# Patient Record
Sex: MALE | Race: WHITE
[De-identification: names, ages, dates, MRNs, and addresses within clinical notes are randomized per-mention and may not be internally consistent; named-entity substitution may affect disease eponyms.]

---

## 2017-10-05 ENCOUNTER — HOSPITAL ENCOUNTER (OUTPATIENT)
Dept: HOSPITAL 47 - SLEEP | Age: 27
End: 2017-10-05
Attending: INTERNAL MEDICINE
Payer: OTHER GOVERNMENT

## 2017-10-05 DIAGNOSIS — G47.33: ICD-10-CM

## 2017-10-05 DIAGNOSIS — K21.9: ICD-10-CM

## 2017-10-05 DIAGNOSIS — Z87.891: ICD-10-CM

## 2017-10-05 DIAGNOSIS — G47.10: Primary | ICD-10-CM

## 2017-10-05 DIAGNOSIS — E66.9: ICD-10-CM

## 2017-10-05 PROCEDURE — 99211 OFF/OP EST MAY X REQ PHY/QHP: CPT

## 2017-10-05 NOTE — CONS
CONSULTATION



DATE OF SERVICE:

10/05/2017



This patient is a 27-year-old gentleman who has been evaluated in the sleep center for

significant excessive daytime sleepiness and possible obstructive sleep apnea/hypopnea

syndrome.



HISTORY OF PRESENT ILLNESS/SLEEP-WAKE EVALUATION:

Patient's usual sleep schedule on weekdays is from 11 p.m. to 6:30 a.m. On weekends he

usually sleeps from 1 a.m. until 10 a.m. Sometimes he has problems with falling asleep,

although no TV in bedroom.  He snores quite loudly and wakes up from sleep about 3

times with dry mouth, heartburn, restless legs, sweating.  No nocturia.



In the morning patient wakes up tired and it is usually difficult for him to get up and

wake up in the morning. He asked his family to help him wake him up in the morning;

otherwise he could sleep up to 16 hours straight if he has time and nobody wakes him

up.



He has difficulties paying attention, falling asleep during the day. He worries about

his sleep, has problems with memory, concentration and irritability.  [place]

Sleepiness Scale is significantly increased at 18, including sleepiness while driving

the car.



PAST MEDICAL HISTORY:

1. Acid reflux.

2. Headaches.



PAST SURGICAL HISTORY:

None.



MEDICATIONS:

None.



SOCIAL HISTORY:

Smoking less than 1 pack per week.  Alcohol consumption rarely.



FAMILY HISTORY:

Sleep apnea, snoring, headaches, acid reflux.



REVIEW OF SYSTEMS:

Awakenings from sleep. Sleepiness during the day. Not waking up from sleep. .



PHYSICAL EXAMINATION:

 gentleman without distress.

VITAL SIGNS: /71, HR 86, RR 16, height 5 feet 6 inches, weight 192, BMI 30.9.

Neck is 15-1/2 inches in circumference. Temperature 98.2. Oxygen saturation at room air

97%.

HEENT: PERRLA, EOMI. Evaluation of oropharynx showed tongue protrudes midline; small

oropharyngeal air space; low position of soft palate.

NECK: Supple. No JVD. Thyroid is not palpable.

LUNGS: Clear to percussion and to auscultation. Good air exchange. No wheezing or

rhonchi.

HEART: S1, S2 irregularly irregular.

ABDOMEN: Slightly obese.

EXTREMITIES: No clubbing or cyanosis.

CNS: Awake, alert and oriented x3. Cranial nerves 2 to 7 intact. There is no

fasciculation or atrophy noted. No focal deficits observed.



IMPRESSION:

1. Snoring, small oropharyngeal air space, multiple awakenings from sleep, daytime

    sleepiness; obstructive sleep apnea/hypopnea syndrome.

2. Significant excessive daytime sleepiness.  [place] Sleepiness Scale increased to

    18.  The patient could sleep for 16 hours straight. Differential diagnosis would

    include idiopathic hypersomnia.

3. Mild obesity; BMI 30.9.

4. History of headaches.

5. Acid reflux.



PLAN:

1. Polysomnography for evaluation of patient's breathing during sleep.

2. CPAP/BiPAP titration if sleep study confirms obstructive sleep apnea-hypopnea

    syndrome.

3. Preferable position during sleep on the side.

4. No driving if patient feels any sleepiness.  Patient is aware of  civil and

    criminal liability for unsafe driving.

5. I will see patient for follow-up visit to explain results of testing and following

    plan.

6. Multiple sleep latency test if polysomnogram is negative for obstructive sleep

    apnea/hypopnea syndrome.

Thank you very much for referring this patient for consultation.



Sincerely,







Shaq Nuñez MD, PhD, FAASM

Diplomat of American Board of Medical Specialties

American Board of Internal Medicine

Medical Director of [facility]



MMODL / IJN: [id number] / Job#: [id number]

## 2018-07-19 ENCOUNTER — HOSPITAL ENCOUNTER (OUTPATIENT)
Dept: HOSPITAL 47 - RADNMMAIN | Age: 28
Discharge: HOME | End: 2018-07-19
Payer: COMMERCIAL

## 2018-07-19 DIAGNOSIS — R10.11: Primary | ICD-10-CM

## 2018-07-19 DIAGNOSIS — R19.7: ICD-10-CM

## 2018-07-19 PROCEDURE — 78226 HEPATOBILIARY SYSTEM IMAGING: CPT

## 2018-07-19 NOTE — NM
EXAMINATION TYPE: NM hepatobiliary w EF

 

DATE OF EXAM: 7/19/2018

 

COMPARISON: NONE

 

HISTORY: Right upper quadrant pain and diarrhea

 

TECHNIQUE: After the intravenous administration of 5.5 mCi Tc 99m Mebrofenin hepatobiliary scintigrap
hy is performed.  Immediate images post injection.

 

FINDINGS: 

There is satisfactory initial accumulation of tracer by the liver.  The gallbladder is visualized wit
hin 4 minutes.  The small bowel activity is noted within 40 minutes.  At one hour 8 ounces of oral en
sure plus is given to mimic CCK and gallbladder ejection fraction is calculated at 38 %, in the giovanni
l range although lower limits of normal.  Therefore there is no scintigraphic evidence of cystic or c
ommon bile duct obstruction to suggest acute cholecystitis or gallbladder dyskinesia. 

 

IMPRESSION: No scintigraphic evidence of acute or chronic cholecystitis. Gallbladder ejection fractio
n is lower limits of normal but does not fit criteria for biliary dyskinesia.

## 2018-08-09 ENCOUNTER — HOSPITAL ENCOUNTER (OUTPATIENT)
Dept: HOSPITAL 47 - ORWHC2ENDO | Age: 28
Discharge: HOME | End: 2018-08-09
Attending: SURGERY
Payer: COMMERCIAL

## 2018-08-09 VITALS — SYSTOLIC BLOOD PRESSURE: 112 MMHG | HEART RATE: 67 BPM | DIASTOLIC BLOOD PRESSURE: 79 MMHG

## 2018-08-09 VITALS — BODY MASS INDEX: 32.3 KG/M2

## 2018-08-09 VITALS — RESPIRATION RATE: 20 BRPM | TEMPERATURE: 98.5 F

## 2018-08-09 DIAGNOSIS — R19.7: Primary | ICD-10-CM

## 2018-08-09 DIAGNOSIS — Z79.899: ICD-10-CM

## 2018-08-09 DIAGNOSIS — R10.13: ICD-10-CM

## 2018-08-09 DIAGNOSIS — Z87.891: ICD-10-CM

## 2018-08-09 DIAGNOSIS — K21.9: ICD-10-CM

## 2018-08-09 PROCEDURE — 45380 COLONOSCOPY AND BIOPSY: CPT

## 2018-08-09 PROCEDURE — 88305 TISSUE EXAM BY PATHOLOGIST: CPT

## 2018-08-09 NOTE — P.OP
Date of Procedure: 08/09/18


Preoperative Diagnosis: 


Diarrhea


Postoperative Diagnosis: 


Normal colon





Random rectal biopsy


Procedure(s) Performed: 


Colonoscopy


Anesthesia: MAC


Surgeon: Julio Falk


Pathology: other (Rectum)


Condition: stable


Disposition: PACU


Description of Procedure: 


The patient's placed on the endoscopy table in the lateral position.  He 

received IV sedation.  Digital rectal exam was performed which revealed no 

abnormalities.  The prostate was symmetrical without nodules.  The possible 

colonoscope was then placed patient anus passed throughout the entire colon.  

The ileocecal valve was visualized.  The cecum, ascending, transverse and 

descending colon appeared normal.  The scope was brought back into the sigmoid 

colon and this appeared normal.  Scope summer back into the rectum and this 

appeared normal.  A random rectal biopsies performed due to the patient's 

symptoms of diarrhea.  Scope was then brought through the anus and there is no 

internal and external hemorrhoids noted.

## 2018-08-09 NOTE — P.GSHP
History of Present Illness


H&P Date: 08/09/18


Chief Complaint: Diarrhea





Is a 20-year-old male who's had complaints of some intermittent diarrhea.  He's 

also had some complaints of epigastric pain when eating.  He presents today for 

colonoscopy..





Past Medical History


Past Medical History: GERD/Reflux


History of Any Multi-Drug Resistant Organisms: None Reported


Past Surgical History: No Surgical Hx Reported


Past Anesthesia/Blood Transfusion Reactions: No Reported Reaction


Smoking Status: Former smoker





- Past Family History


  ** Mother


Family Medical History: No Reported History





Medications and Allergies


 Home Medications











 Medication  Instructions  Recorded  Confirmed  Type


 


Montelukast [Singulair] 10 mg PO HS 08/08/18 08/09/18 History


 


Omeprazole 40 mg PO DAILY 08/08/18 08/09/18 History











 Allergies











Allergy/AdvReac Type Severity Reaction Status Date / Time


 


No Known Allergies Allergy   Verified 08/08/18 11:54














Surgical - Exam


 Vital Signs











Temp Pulse Resp BP Pulse Ox


 


 98.5 F   98   20   147/81   95 


 


 08/09/18 07:07  08/09/18 07:07  08/09/18 07:07  08/09/18 07:07  08/09/18 07:07














- General


well developed, no distress





- Eyes


PERRL





- ENT


normal pinna





- Neck


no masses





- Respiratory


normal expansion





- Cardiovascular


Rhythm: regular





- Abdomen


Abdomen: soft, non tender





Assessment and Plan


Assessment: 





Diarrhea.  We'll perform colonoscopy.

## 2018-10-18 ENCOUNTER — HOSPITAL ENCOUNTER (EMERGENCY)
Dept: HOSPITAL 47 - EC | Age: 28
Discharge: HOME | End: 2018-10-18
Payer: OTHER GOVERNMENT

## 2018-10-18 VITALS — DIASTOLIC BLOOD PRESSURE: 94 MMHG | SYSTOLIC BLOOD PRESSURE: 115 MMHG | HEART RATE: 70 BPM

## 2018-10-18 VITALS — TEMPERATURE: 98.3 F

## 2018-10-18 VITALS — RESPIRATION RATE: 18 BRPM

## 2018-10-18 DIAGNOSIS — L02.236: Primary | ICD-10-CM

## 2018-10-18 DIAGNOSIS — F17.200: ICD-10-CM

## 2018-10-18 LAB
ALBUMIN SERPL-MCNC: 4.6 G/DL (ref 3.5–5)
ALP SERPL-CCNC: 45 U/L (ref 38–126)
ALT SERPL-CCNC: 50 U/L (ref 21–72)
AMYLASE SERPL-CCNC: 67 U/L (ref 30–110)
ANION GAP SERPL CALC-SCNC: 12 MMOL/L
AST SERPL-CCNC: 39 U/L (ref 17–59)
BASOPHILS # BLD AUTO: 0 K/UL (ref 0–0.2)
BASOPHILS NFR BLD AUTO: 0 %
BUN SERPL-SCNC: 18 MG/DL (ref 9–20)
CALCIUM SPEC-MCNC: 9.7 MG/DL (ref 8.4–10.2)
CHLORIDE SERPL-SCNC: 103 MMOL/L (ref 98–107)
CO2 SERPL-SCNC: 26 MMOL/L (ref 22–30)
EOSINOPHIL # BLD AUTO: 0.5 K/UL (ref 0–0.7)
EOSINOPHIL NFR BLD AUTO: 6 %
ERYTHROCYTE [DISTWIDTH] IN BLOOD BY AUTOMATED COUNT: 5.22 M/UL (ref 4.3–5.9)
ERYTHROCYTE [DISTWIDTH] IN BLOOD: 12.9 % (ref 11.5–15.5)
GLUCOSE SERPL-MCNC: 124 MG/DL (ref 74–99)
HCT VFR BLD AUTO: 44.5 % (ref 39–53)
HGB BLD-MCNC: 15.5 GM/DL (ref 13–17.5)
LIPASE SERPL-CCNC: 155 U/L (ref 23–300)
LYMPHOCYTES # SPEC AUTO: 3.4 K/UL (ref 1–4.8)
LYMPHOCYTES NFR SPEC AUTO: 35 %
MCH RBC QN AUTO: 29.8 PG (ref 25–35)
MCHC RBC AUTO-ENTMCNC: 35 G/DL (ref 31–37)
MCV RBC AUTO: 85.2 FL (ref 80–100)
MONOCYTES # BLD AUTO: 0.5 K/UL (ref 0–1)
MONOCYTES NFR BLD AUTO: 5 %
NEUTROPHILS # BLD AUTO: 5.2 K/UL (ref 1.3–7.7)
NEUTROPHILS NFR BLD AUTO: 53 %
PH UR: 6 [PH] (ref 5–8)
PLATELET # BLD AUTO: 273 K/UL (ref 150–450)
POTASSIUM SERPL-SCNC: 4 MMOL/L (ref 3.5–5.1)
PROT SERPL-MCNC: 7.2 G/DL (ref 6.3–8.2)
SODIUM SERPL-SCNC: 141 MMOL/L (ref 137–145)
SP GR UR: 1 (ref 1–1.03)
UROBILINOGEN UR QL STRIP: <2 MG/DL (ref ?–2)
WBC # BLD AUTO: 9.8 K/UL (ref 3.8–10.6)

## 2018-10-18 PROCEDURE — 96361 HYDRATE IV INFUSION ADD-ON: CPT

## 2018-10-18 PROCEDURE — 82150 ASSAY OF AMYLASE: CPT

## 2018-10-18 PROCEDURE — 74177 CT ABD & PELVIS W/CONTRAST: CPT

## 2018-10-18 PROCEDURE — 85025 COMPLETE CBC W/AUTO DIFF WBC: CPT

## 2018-10-18 PROCEDURE — 96360 HYDRATION IV INFUSION INIT: CPT

## 2018-10-18 PROCEDURE — 81003 URINALYSIS AUTO W/O SCOPE: CPT

## 2018-10-18 PROCEDURE — 99284 EMERGENCY DEPT VISIT MOD MDM: CPT

## 2018-10-18 PROCEDURE — 36415 COLL VENOUS BLD VENIPUNCTURE: CPT

## 2018-10-18 PROCEDURE — 80053 COMPREHEN METABOLIC PANEL: CPT

## 2018-10-18 PROCEDURE — 83690 ASSAY OF LIPASE: CPT

## 2018-10-18 NOTE — CT
EXAMINATION TYPE: CT abdomen pelvis w con

 

DATE OF EXAM: 10/18/2018

 

COMPARISON: None

 

HISTORY: Umbilical pain and bleeding through umbilical area.

 

CT DLP: 1046.2 mGycm

Automated exposure control for dose reduction was used.

 

TECHNIQUE:  Helical acquisition of images was performed from the lung bases through the pelvis.

 

CONTRAST: 

Performed without Oral Contrast and with IV Contrast, patient injected with 100ml mL of Isovue 300.

 

FINDINGS: 

 

Lung bases are clear. There is no pleural effusion. Heart size is normal.

 

There is decreased density in the liver related to fatty infiltration. Spleen appears normal. There i
s no pancreatic mass. Stomach appears normal. Gallbladder appears normal.

 

There is no adrenal mass. Kidneys show satisfactory contrast opacification. There is no hydronephrosi
s. Ureters are not dilated. There is no retroperitoneal adenopathy. There is no mesenteric adenopathy
 or edema. There is no ascites. Appendix appears normal. I see no intestinal wall thickening. There a
re no dilated loops. Bladder distends smoothly.

 

There is no evidence of a pelvic mass. There is no free fluid in the pelvis. There is no evidence of 
free air. Lumbar spine appears intact. Disc spaces are normal. Bony pelvis appears intact. There is n
o inguinal hernia. There are bilateral inguinal lymph nodes measure up to 1.5 cm.

 

There is no evidence of an umbilical mass. There is mild skin thickening at the umbilicus.

IMPRESSION: 

MINIMAL SKIN THICKENING AT THE UMBILICUS. CLINICAL SIGNIFICANCE IS NOT CLEAR.

 

FATTY INFILTRATION OF THE LIVER.

## 2018-10-18 NOTE — ED
Abdominal Pain HPI





- General


Chief Complaint: Abdominal Pain


Stated Complaint: abd pain


Time Seen by Provider: 10/18/18 19:36


Source: patient


Mode of arrival: ambulatory


Limitations: no limitations





- History of Present Illness


Initial Comments: 


29yo male with CC of belly button bleeding x 45 mintues prior to arrival. Pt 

stated for the past 3 days he was experiencing pain in the bellybutton, today 

he noticed bleeding/pus like substance coming from the bellybutton and 

presented for evaluation. Pt denies abdominal pain, diarrhea, constipation, 

nausea, vomiting, fever, chills, nightsweats, congenital defects, ventral hernia

, melena, hematochezia, hematemesis, chest pain, shortness breath or any other 

social she symptoms.  He was not sure why his umbilicus was bleeding so he 

presented for evaluation. Upon arrival VS within acceptable limits. Pt appears 

well. 








- Related Data


 Previous Rx's











 Medication  Instructions  Recorded


 


Sulfamethox-Tmp 800-160Mg [Bactrim 1 tab PO Q12HR 5 Days #10 tab 10/18/18





-160 mg]  











 Allergies











Allergy/AdvReac Type Severity Reaction Status Date / Time


 


No Known Allergies Allergy   Verified 10/18/18 20:39














Review of Systems


ROS Statement: 


Those systems with pertinent positive or pertinent negative responses have been 

documented in the HPI.





ROS Other: All systems not noted in ROS Statement are negative.


Constitutional: Denies: fever, chills, night sweats


ENT: Denies: ear pain, throat pain


Respiratory: Denies: cough, dyspnea, wheezes, hemoptysis, stridor


Gastrointestinal: Reports: as per HPI (umbilicus bleeding).  Denies: abdominal 

pain, nausea, vomiting, diarrhea, constipation, hematemesis, melena, 

hematochezia


Genitourinary: Denies: urgency, dysuria, frequency, hematuria


Musculoskeletal: Denies: back pain, joint swelling, arthralgia


Skin: Denies: rash, lesions


Neurological: Denies: headache, weakness, numbness, paresthesias, confusion





Past Medical History


Past Medical History: GERD/Reflux


History of Any Multi-Drug Resistant Organisms: None Reported


Past Surgical History: No Surgical Hx Reported


Past Anesthesia/Blood Transfusion Reactions: No Reported Reaction


Past Psychological History: No Psychological Hx Reported


Smoking Status: Current every day smoker


Past Alcohol Use History: None Reported


Past Drug Use History: None Reported





- Past Family History


  ** Mother


Family Medical History: No Reported History





General Exam





- General Exam Comments


Initial Comments: 


General:  The patient is awake and alert, in no distress, and does not appear 

acutely ill. 


Eye:  Pupils are equal, round and reactive to light, extra-ocular movements are 

intact.  No nystagmus.  There is normal conjunctiva bilaterally.  No signs of 

icterus.  


Ears, nose, mouth and throat:  There are moist mucous membranes and no oral 

lesions. 


Neck:  The neck is supple, there is no tenderness or JVD.  


Cardiovascular:  There is a regular rate and rhythm. No murmur, rub or gallop 

is appreciated.


Respiratory:  Lungs are clear to auscultation, respirations are non-labored, 

breath sounds are equal.  No wheezes, stridor, rales, or rhonchi.


Gastrointestinal:  No noted diaphoresis, jaundice, pallor, protecting postures 

or squirming. Symmetrical pigmentation of abdomen without signs of inflammation

, scarring. Striae present over abdomen. Umbilicus mildline, inverted without 

swelling, there is blood inside umbilicus, small carbuncle noted inside of 

umbilicus. No dilated veins. Abdomen contour obese, no noted abdominal 

distention. No visible masses. No peristalsis, aortic pulsations, or ventral 

hernia. Bowel sounds audible in all 4 quadrants, unremarkable.  No friction 

rubs or venous hums. No epigastic, hepatic or abdominal bruits.  No tenderness 

to light or deep palpation. Liver edge, not palpable. Spleen edge, right and 

left kidney not palpable. Superior bladder margin non-tender. 


Special Testing:


Negative Rowe, Rovsing, McBurney, Blumberg, cutaneous hyperesthesia. 

Negative Heel Jar test. No CVA tenderness. Digital rectal exam deferred. 

Negative grey turners or cullens sign


Musculoskeletal:  Normal ROM, no tenderness.  Strength 5/5. Sensation intact. 

Pulses equal bilaterally 2+.  


Neurological:  A&O x 3. CN II-XII intact, There are no obvious motor or sensory 

deficits. Coordination appears grossly intact. Speech is normal.


Skin:  Skin is warm and dry and no rashes or lesions are noted. 


Psychiatric:  Cooperative, appropriate mood & affect, normal judgment.  





Limitations: no limitations





Course


 Vital Signs











  10/18/18 10/18/18 10/18/18





  19:24 20:30 21:00


 


Temperature 98.4 F  


 


Pulse Rate 106 H 85 89


 


Respiratory 18 18 18





Rate   


 


Blood Pressure 142/87 133/85 110/91


 


O2 Sat by Pulse 97 98 98





Oximetry   














  10/18/18 10/18/18 10/18/18





  21:30 22:00 22:28


 


Temperature   98.3 F


 


Pulse Rate 72 70 


 


Respiratory 17 18 





Rate   


 


Blood Pressure 119/76 115/94 


 


O2 Sat by Pulse 97 98 





Oximetry   














Medical Decision Making





- Medical Decision Making


Labs unremarkable, WBC WNL. CT (-) for urachal cyst or reminants. PE revealed 

carbuncle like lesion, no significant surrounding erythema. No abdominal pain 

on PE concerning for acute abdomen.  Umbilicus  was irrigated, pt was rx for 

bactrim. Case discussed in detail with Dr. Moreira. At this time we feel pt is 

stable for d/c with f/u with PCP in 1-2 days.  Return parameters discussed.  

Patient verbalized understanding, denied questions.  Patient discharged in 

stable condition.








- Lab Data


Result diagrams: 


 10/18/18 19:54





 10/18/18 19:54


 Lab Results











  10/18/18 10/18/18 10/18/18 Range/Units





  19:40 19:54 19:54 


 


WBC    9.8  (3.8-10.6)  k/uL


 


RBC    5.22  (4.30-5.90)  m/uL


 


Hgb    15.5  (13.0-17.5)  gm/dL


 


Hct    44.5  (39.0-53.0)  %


 


MCV    85.2  (80.0-100.0)  fL


 


MCH    29.8  (25.0-35.0)  pg


 


MCHC    35.0  (31.0-37.0)  g/dL


 


RDW    12.9  (11.5-15.5)  %


 


Plt Count    273  (150-450)  k/uL


 


Neutrophils %    53  %


 


Lymphocytes %    35  %


 


Monocytes %    5  %


 


Eosinophils %    6  %


 


Basophils %    0  %


 


Neutrophils #    5.2  (1.3-7.7)  k/uL


 


Lymphocytes #    3.4  (1.0-4.8)  k/uL


 


Monocytes #    0.5  (0-1.0)  k/uL


 


Eosinophils #    0.5  (0-0.7)  k/uL


 


Basophils #    0.0  (0-0.2)  k/uL


 


Sodium   141   (137-145)  mmol/L


 


Potassium   4.0   (3.5-5.1)  mmol/L


 


Chloride   103   ()  mmol/L


 


Carbon Dioxide   26   (22-30)  mmol/L


 


Anion Gap   12   mmol/L


 


BUN   18   (9-20)  mg/dL


 


Creatinine   1.01   (0.66-1.25)  mg/dL


 


Est GFR (CKD-EPI)AfAm   >90   (>60 ml/min/1.73 sqM)  


 


Est GFR (CKD-EPI)NonAf   >90   (>60 ml/min/1.73 sqM)  


 


Glucose   124 H   (74-99)  mg/dL


 


Calcium   9.7   (8.4-10.2)  mg/dL


 


Total Bilirubin   0.7   (0.2-1.3)  mg/dL


 


AST   39   (17-59)  U/L


 


ALT   50   (21-72)  U/L


 


Alkaline Phosphatase   45   ()  U/L


 


Total Protein   7.2   (6.3-8.2)  g/dL


 


Albumin   4.6   (3.5-5.0)  g/dL


 


Amylase   67   ()  U/L


 


Lipase   155   ()  U/L


 


Urine Color  Colorless    


 


Urine Appearance  Clear    (Clear)  


 


Urine pH  6.0    (5.0-8.0)  


 


Ur Specific Gravity  1.005    (1.001-1.035)  


 


Urine Protein  Negative    (Negative)  


 


Urine Glucose (UA)  Negative    (Negative)  


 


Urine Ketones  Negative    (Negative)  


 


Urine Blood  Negative    (Negative)  


 


Urine Nitrite  Negative    (Negative)  


 


Urine Bilirubin  Negative    (Negative)  


 


Urine Urobilinogen  <2.0    (<2.0)  mg/dL


 


Ur Leukocyte Esterase  Negative    (Negative)  














Disposition


Clinical Impression: 


 Carbuncle of umbilicus





Disposition: HOME SELF-CARE


Condition: Good


Instructions:  Furunculosis and Carbunculosis (ED)


Additional Instructions: 


Please use medication as discussed.  Please follow-up with family doctor in the 

next 2 days.  Please return to emergency room if the symptoms increase or 

worsen or for any other concerns.


Prescriptions: 


Sulfamethox-Tmp 800-160Mg [Bactrim -160 mg] 1 tab PO Q12HR 5 Days #10 tab


Is patient prescribed a controlled substance at d/c from ED?: No


Referrals: 


Brianne Meade DO [Primary Care Provider] - 1-2 days


Time of Disposition: 22:09

## 2019-08-13 ENCOUNTER — HOSPITAL ENCOUNTER (OUTPATIENT)
Dept: HOSPITAL 47 - LABWHC1 | Age: 29
Discharge: HOME | End: 2019-08-13
Attending: SURGERY
Payer: COMMERCIAL

## 2019-08-13 DIAGNOSIS — Z01.812: Primary | ICD-10-CM

## 2019-08-13 DIAGNOSIS — K46.9: ICD-10-CM

## 2019-08-13 LAB
ERYTHROCYTE [DISTWIDTH] IN BLOOD BY AUTOMATED COUNT: 5.7 M/UL (ref 4.3–5.9)
ERYTHROCYTE [DISTWIDTH] IN BLOOD: 13.3 % (ref 11.5–15.5)
HCT VFR BLD AUTO: 49.7 % (ref 39–53)
HGB BLD-MCNC: 16.8 GM/DL (ref 13–17.5)
MCH RBC QN AUTO: 29.5 PG (ref 25–35)
MCHC RBC AUTO-ENTMCNC: 33.9 G/DL (ref 31–37)
MCV RBC AUTO: 87.1 FL (ref 80–100)
PLATELET # BLD AUTO: 339 K/UL (ref 150–450)
WBC # BLD AUTO: 9.3 K/UL (ref 3.8–10.6)

## 2019-08-13 PROCEDURE — 36415 COLL VENOUS BLD VENIPUNCTURE: CPT

## 2019-08-13 PROCEDURE — 85027 COMPLETE CBC AUTOMATED: CPT

## 2019-08-20 ENCOUNTER — HOSPITAL ENCOUNTER (OUTPATIENT)
Dept: HOSPITAL 47 - OR | Age: 29
Discharge: HOME | End: 2019-08-20
Attending: SURGERY
Payer: COMMERCIAL

## 2019-08-20 VITALS — DIASTOLIC BLOOD PRESSURE: 77 MMHG | HEART RATE: 85 BPM | SYSTOLIC BLOOD PRESSURE: 132 MMHG

## 2019-08-20 VITALS — BODY MASS INDEX: 31.4 KG/M2

## 2019-08-20 VITALS — RESPIRATION RATE: 16 BRPM

## 2019-08-20 VITALS — TEMPERATURE: 96.9 F

## 2019-08-20 DIAGNOSIS — K42.9: Primary | ICD-10-CM

## 2019-08-20 DIAGNOSIS — F17.200: ICD-10-CM

## 2019-08-20 PROCEDURE — 49652: CPT

## 2019-08-20 PROCEDURE — 64486 TAP BLOCK UNIL BY INJECTION: CPT

## 2019-08-20 RX ADMIN — HYDROMORPHONE HYDROCHLORIDE PRN MG: 1 INJECTION, SOLUTION INTRAMUSCULAR; INTRAVENOUS; SUBCUTANEOUS at 10:49

## 2019-08-20 RX ADMIN — HYDROMORPHONE HYDROCHLORIDE PRN MG: 1 INJECTION, SOLUTION INTRAMUSCULAR; INTRAVENOUS; SUBCUTANEOUS at 10:39

## 2019-08-20 RX ADMIN — HYDROMORPHONE HYDROCHLORIDE PRN MG: 1 INJECTION, SOLUTION INTRAMUSCULAR; INTRAVENOUS; SUBCUTANEOUS at 10:34

## 2019-08-20 NOTE — P.OP
Date of Procedure: 08/20/19


Preoperative Diagnosis: 


Umbilical hernia


Postoperative Diagnosis: 


Umbilical hernia


Procedure(s) Performed: 


Laparoscopic robotic-assisted repair of umbilical hernia


Anesthesia: MAC


Surgeon: Julio Falk


Pathology: none sent


Condition: stable


Disposition: PACU


Description of Procedure: 


The patient was placed on the operating table in the supine position.  He 

received general anesthesia.  His abdomen was prepped and draped usual fashion. 

Using a 5 mm optical trocar under direct visualization the peritoneal cavity was

entered in the left upper quadrant.  The abdomen was then insufflated.  The 

laparoscope was placed back into the perineal cavity.  Next a 8 mm robotic 

trocar was placed in the left lower quadrant and a 12 mm robotic trocar was 

placed in the left lateral position.  The original 5 mm trocar was exchanged for

a 8 mm robotic trocar.  The patient's placed in the left side up position.  And 

the patient was undocked the robot.





The umbilical hernia was visualized.  Using hook cautery the peritoneum over the

umbilical hernia was excised.  The fascial opening was repaired using 0V LOC 

suture.  Next a piece of 11 cm round ventral light ST mesh was placed into the. 

Cavity and secured with 2 OV lock suture.





The patient was undocked the robot.  The needles were retrieved.  The fascia of 

the 12 mm trocar site was closed with 0 Ethibond suture.  Skin was closed 

interrupted 3-0 Monocryl suture.  Dermabond dressings was applied.  Patient top 

procedure well and was sent to recovery room stable condition.

## 2019-08-20 NOTE — P.GSHP
History of Present Illness


H&P Date: 08/20/19


Chief Complaint: Umbilical hernia





This a 29-year-old male has developed umbilical hernia.  Patient resents today 

for laparoscopic robotic-assisted repair.





Past Medical History


Past Medical History: GERD/Reflux


Additional Past Medical History / Comment(s): HERNIA


History of Any Multi-Drug Resistant Organisms: None Reported


Past Surgical History: No Surgical Hx Reported


Additional Past Surgical History / Comment(s): COLONSCOPY


Past Anesthesia/Blood Transfusion Reactions: No Reported Reaction


Smoking Status: Current every day smoker





- Past Family History


  ** Mother


Family Medical History: No Reported History





Medications and Allergies


                                Home Medications











 Medication  Instructions  Recorded  Confirmed  Type


 


No Known Home Medications  08/13/19 08/20/19 History








                                    Allergies











Allergy/AdvReac Type Severity Reaction Status Date / Time


 


No Known Allergies Allergy   Verified 08/20/19 08:01














Surgical - Exam


                                   Vital Signs











Temp Pulse Resp BP Pulse Ox


 


 97.2 F L  72   16   115/73   96 


 


 08/20/19 08:09  08/20/19 08:09  08/20/19 08:09  08/20/19 08:09  08/20/19 08:09














- General


well developed, well nourished, no distress





- Eyes


PERRL





- ENT


normal pinna





- Neck


no masses





- Respiratory


normal expansion





- Cardiovascular


Rhythm: regular





- Abdomen


Abdomen: soft, non tender


Hernia: umbilical





Assessment and Plan


Assessment: 





Umbilical hernia.  We'll perform laparoscopic robotic-assisted repair

## 2019-08-20 NOTE — P.ANPRN
Procedure Note - Anesthesia





- Nerve Block Performed


  ** Bilateral Rectus Abdominis Infusion


Time Out Performed: Yes


Date of Procedure: 08/20/19


Procedure Start Time: 10:55


Location of Patient Procedure: PACU


Indication: Acute Post-Operative Pain


Specifically requested for management of pain by DrKulwant: Julio Falk


Sedation Type: Sedate with meaningful contact maintained


Preparation: Sterile Prep


Position: Supine


Catheter: None


Needle Types: Pajunk


Needle Gauge: 21


Technique: Ultrasound


Injectate: Other (see comment) (0.25% ropivacaine/0.5% lidocaine 25 mL each 

side)


Adjunct: Epinephrine (see comment for dilution ratio) (1:200,000)


Blood Aspirated: No


Pain Paresthesia on Injection Noted: No


Resistance on Injection: Normal


Events: Uneventful and Well Tolerated